# Patient Record
Sex: FEMALE | NOT HISPANIC OR LATINO | URBAN - METROPOLITAN AREA
[De-identification: names, ages, dates, MRNs, and addresses within clinical notes are randomized per-mention and may not be internally consistent; named-entity substitution may affect disease eponyms.]

---

## 2023-06-08 DIAGNOSIS — Z12.31 SCREENING MAMMOGRAM FOR BREAST CANCER: Primary | ICD-10-CM

## 2023-06-09 ENCOUNTER — HOSPITAL ENCOUNTER (OUTPATIENT)
Dept: RADIOLOGY | Facility: HOSPITAL | Age: 43
Discharge: HOME | End: 2023-06-09
Attending: HEALTH CARE PROVIDER
Payer: OTHER GOVERNMENT

## 2023-06-09 DIAGNOSIS — Z12.31 SCREENING MAMMOGRAM FOR BREAST CANCER: ICD-10-CM

## 2023-06-09 PROCEDURE — 77067 MAMMO DIGITAL SCREENING BILAT WITH TOMO: ICD-10-PCS | Mod: 26,,, | Performed by: RADIOLOGY

## 2023-06-09 PROCEDURE — 77067 SCR MAMMO BI INCL CAD: CPT | Mod: 26,,, | Performed by: RADIOLOGY

## 2023-06-09 PROCEDURE — 77063 MAMMO DIGITAL SCREENING BILAT WITH TOMO: ICD-10-PCS | Mod: 26,,, | Performed by: RADIOLOGY

## 2023-06-09 PROCEDURE — 77063 BREAST TOMOSYNTHESIS BI: CPT | Mod: 26,,, | Performed by: RADIOLOGY

## 2023-06-09 PROCEDURE — 77067 SCR MAMMO BI INCL CAD: CPT | Mod: TC

## 2024-05-20 PROCEDURE — 99285 EMERGENCY DEPT VISIT HI MDM: CPT

## 2024-05-21 ENCOUNTER — HOSPITAL ENCOUNTER (EMERGENCY)
Facility: HOSPITAL | Age: 44
Discharge: HOME | End: 2024-05-21
Attending: STUDENT IN AN ORGANIZED HEALTH CARE EDUCATION/TRAINING PROGRAM
Payer: OTHER GOVERNMENT

## 2024-05-21 VITALS
BODY MASS INDEX: 28.73 KG/M2 | HEIGHT: 69 IN | DIASTOLIC BLOOD PRESSURE: 72 MMHG | HEART RATE: 105 BPM | WEIGHT: 194 LBS | OXYGEN SATURATION: 100 % | SYSTOLIC BLOOD PRESSURE: 121 MMHG | RESPIRATION RATE: 20 BRPM | TEMPERATURE: 98 F

## 2024-05-21 DIAGNOSIS — R10.13 EPIGASTRIC PAIN: Primary | ICD-10-CM

## 2024-05-21 DIAGNOSIS — K80.20 CALCULUS OF GALLBLADDER WITHOUT CHOLECYSTITIS WITHOUT OBSTRUCTION: ICD-10-CM

## 2024-05-21 DIAGNOSIS — R74.8 ELEVATED LIVER ENZYMES: ICD-10-CM

## 2024-05-21 LAB
ALBUMIN SERPL BCP-MCNC: 3.8 G/DL (ref 3.5–5.2)
ALP SERPL-CCNC: 104 U/L (ref 55–135)
ALT SERPL W/O P-5'-P-CCNC: 379 U/L (ref 10–44)
ANION GAP SERPL CALC-SCNC: 10 MMOL/L (ref 8–16)
AST SERPL-CCNC: 337 U/L (ref 10–40)
B-HCG UR QL: NEGATIVE
BACTERIA #/AREA URNS HPF: NORMAL /HPF
BASOPHILS # BLD AUTO: 0.03 K/UL (ref 0–0.2)
BASOPHILS NFR BLD: 0.2 % (ref 0–1.9)
BILIRUB SERPL-MCNC: 0.4 MG/DL (ref 0.1–1)
BILIRUB UR QL STRIP: NEGATIVE
BUN SERPL-MCNC: 15 MG/DL (ref 6–20)
CALCIUM SERPL-MCNC: 9.5 MG/DL (ref 8.7–10.5)
CHLORIDE SERPL-SCNC: 109 MMOL/L (ref 95–110)
CLARITY UR: CLEAR
CO2 SERPL-SCNC: 19 MMOL/L (ref 23–29)
COLOR UR: YELLOW
CREAT SERPL-MCNC: 0.9 MG/DL (ref 0.5–1.4)
CTP QC/QA: YES
DIFFERENTIAL METHOD BLD: ABNORMAL
EOSINOPHIL # BLD AUTO: 0.1 K/UL (ref 0–0.5)
EOSINOPHIL NFR BLD: 0.8 % (ref 0–8)
ERYTHROCYTE [DISTWIDTH] IN BLOOD BY AUTOMATED COUNT: 12.7 % (ref 11.5–14.5)
EST. GFR  (NO RACE VARIABLE): >60 ML/MIN/1.73 M^2
GLUCOSE SERPL-MCNC: 103 MG/DL (ref 70–110)
GLUCOSE UR QL STRIP: NEGATIVE
HCT VFR BLD AUTO: 38 % (ref 37–48.5)
HGB BLD-MCNC: 12.6 G/DL (ref 12–16)
HGB UR QL STRIP: NEGATIVE
IMM GRANULOCYTES # BLD AUTO: 0.03 K/UL (ref 0–0.04)
IMM GRANULOCYTES NFR BLD AUTO: 0.2 % (ref 0–0.5)
KETONES UR QL STRIP: NEGATIVE
LEUKOCYTE ESTERASE UR QL STRIP: ABNORMAL
LIPASE SERPL-CCNC: 66 U/L (ref 4–60)
LYMPHOCYTES # BLD AUTO: 3.3 K/UL (ref 1–4.8)
LYMPHOCYTES NFR BLD: 27 % (ref 18–48)
MAGNESIUM SERPL-MCNC: 2.3 MG/DL (ref 1.6–2.6)
MCH RBC QN AUTO: 29.9 PG (ref 27–31)
MCHC RBC AUTO-ENTMCNC: 33.2 G/DL (ref 32–36)
MCV RBC AUTO: 90 FL (ref 82–98)
MICROSCOPIC COMMENT: NORMAL
MONOCYTES # BLD AUTO: 0.7 K/UL (ref 0.3–1)
MONOCYTES NFR BLD: 5.7 % (ref 4–15)
NEUTROPHILS # BLD AUTO: 8.2 K/UL (ref 1.8–7.7)
NEUTROPHILS NFR BLD: 66.1 % (ref 38–73)
NITRITE UR QL STRIP: NEGATIVE
NRBC BLD-RTO: 0 /100 WBC
OHS QRS DURATION: 84 MS
OHS QTC CALCULATION: 455 MS
PH UR STRIP: 7 [PH] (ref 5–8)
PLATELET # BLD AUTO: 309 K/UL (ref 150–450)
PMV BLD AUTO: 10.7 FL (ref 9.2–12.9)
POTASSIUM SERPL-SCNC: 3.8 MMOL/L (ref 3.5–5.1)
PROT SERPL-MCNC: 7.1 G/DL (ref 6–8.4)
PROT UR QL STRIP: NEGATIVE
RBC # BLD AUTO: 4.21 M/UL (ref 4–5.4)
RBC #/AREA URNS HPF: 3 /HPF (ref 0–4)
SODIUM SERPL-SCNC: 138 MMOL/L (ref 136–145)
SP GR UR STRIP: 1.02 (ref 1–1.03)
TROPONIN I SERPL DL<=0.01 NG/ML-MCNC: <0.006 NG/ML (ref 0–0.03)
URN SPEC COLLECT METH UR: ABNORMAL
UROBILINOGEN UR STRIP-ACNC: NEGATIVE EU/DL
WBC # BLD AUTO: 12.36 K/UL (ref 3.9–12.7)
WBC #/AREA URNS HPF: 2 /HPF (ref 0–5)

## 2024-05-21 PROCEDURE — 83690 ASSAY OF LIPASE: CPT | Performed by: PHYSICIAN ASSISTANT

## 2024-05-21 PROCEDURE — 85025 COMPLETE CBC W/AUTO DIFF WBC: CPT | Performed by: PHYSICIAN ASSISTANT

## 2024-05-21 PROCEDURE — 81000 URINALYSIS NONAUTO W/SCOPE: CPT | Performed by: PHYSICIAN ASSISTANT

## 2024-05-21 PROCEDURE — 93010 ELECTROCARDIOGRAM REPORT: CPT | Mod: ,,, | Performed by: INTERNAL MEDICINE

## 2024-05-21 PROCEDURE — 83735 ASSAY OF MAGNESIUM: CPT | Performed by: PHYSICIAN ASSISTANT

## 2024-05-21 PROCEDURE — 93005 ELECTROCARDIOGRAM TRACING: CPT

## 2024-05-21 PROCEDURE — 81025 URINE PREGNANCY TEST: CPT | Performed by: STUDENT IN AN ORGANIZED HEALTH CARE EDUCATION/TRAINING PROGRAM

## 2024-05-21 PROCEDURE — 84484 ASSAY OF TROPONIN QUANT: CPT | Performed by: PHYSICIAN ASSISTANT

## 2024-05-21 PROCEDURE — 80053 COMPREHEN METABOLIC PANEL: CPT | Performed by: PHYSICIAN ASSISTANT

## 2024-05-21 RX ORDER — FAMOTIDINE 20 MG/1
20 TABLET, FILM COATED ORAL 2 TIMES DAILY
Qty: 28 TABLET | Refills: 0 | Status: SHIPPED | OUTPATIENT
Start: 2024-05-21 | End: 2024-06-04

## 2024-05-21 RX ORDER — ONDANSETRON 4 MG/1
4 TABLET, ORALLY DISINTEGRATING ORAL EVERY 6 HOURS PRN
Qty: 12 TABLET | Refills: 0 | Status: SHIPPED | OUTPATIENT
Start: 2024-05-21

## 2024-05-21 RX ORDER — DICYCLOMINE HYDROCHLORIDE 20 MG/1
20 TABLET ORAL 4 TIMES DAILY PRN
Qty: 20 TABLET | Refills: 0 | Status: SHIPPED | OUTPATIENT
Start: 2024-05-21 | End: 2024-06-20

## 2024-05-21 RX ORDER — OXYCODONE HYDROCHLORIDE 5 MG/1
5 TABLET ORAL EVERY 6 HOURS PRN
Qty: 12 TABLET | Refills: 0 | Status: SHIPPED | OUTPATIENT
Start: 2024-05-21

## 2024-05-21 NOTE — ED PROVIDER NOTES
Encounter Date: 5/20/2024       History     Chief Complaint   Patient presents with    Flank Pain     Pt presents to ER with complaints of right side pain and nausea since Saturday. Pt states she vomited today. Pt states when the pain is bad she feels as if she can't breath like she might be having a heart attack. Pt states she os also bloated. Pt states the pain comes in waves and she gets sweaty and freezing at the same time. Pt rates pain 8/10 at this time. Pt denies any other issues at this time.       43-year-old female presents to ED complaining of severe upper abdominal pain with associated nausea vomiting which began on Saturday.    Patient states Saturday midday after [employer] she developed intense, sharp, severe pain to the epigastric abdomen.  Admits to associated diaphoresis, nausea, bloating to the upper abdomen.  Pain radiated to her bilateral upper back.  She states after a couple of hours the pain eventually resolved. She states she has had 2 more such episodes each evening for the past 2 nights.  Typically she is lying in bed when she begins with severe pain.  Feels the need to sit upright, leaning forward.  Pain is worsened when she lies supine.  She denies history of reflux, denies history of any gallbladder issues.  She does admit to somewhat of chronic constipation; she states when she passes gas there is no real improvement of discomfort.  Took a laxative to see if that would improve her symptoms,  however states she did have a normal BM today with recurrence of symptoms later on this evening.  Denies melena or hematochezia.  Denies history of GI bleeding.  Pain is not necessarily postprandial, typically occurs when she is lying down at night over the past 2 nights.  No urinary complaints.    No personal history of ACS.  Denies chest pain or shortness of breath.  She does state that there is pain with deep inspiration when she is having the discomfort.  Otherwise no shortness of breath.  Denies  hypertension, DM.  Denies prediabetes.  States she was told she had mild hyperlipidemia, not currently on any medications.  Nonsmoker.  Denies known family history of premature cardiac disease.  No syncope.  No palpitations.    No cough, no fever chills, no recent illness.  No exogenous estrogen.  No unilateral leg swelling or calf pain.  Denies history of CHF.  Pain resolved prior to my evaluation this evening.  Pain did occur following a steak dinner tonight.    No history of any abdominal surgeries    PMH:  GERD   Make bipolar disorder   History of sinusitis   Generalized anxiety disorder   Major depression   Obsessive-compulsive disorder   Right breast mass   Hypercholesterolemia         Review of patient's allergies indicates:  No Known Allergies  No past medical history on file.  Past Surgical History:   Procedure Laterality Date    BREAST BIOPSY Right     benign-2022     Family History   Problem Relation Name Age of Onset    Breast cancer Paternal Aunt pa 60    Breast cancer Paternal Aunt aneta 60    Ovarian cancer Paternal Grandmother  70        Review of Systems   Constitutional:  Positive for diaphoresis. Negative for appetite change and fever.   Respiratory:  Negative for cough and shortness of breath.    Cardiovascular:  Negative for chest pain, palpitations and leg swelling.   Gastrointestinal:  Positive for abdominal pain, nausea and vomiting. Negative for blood in stool, constipation and diarrhea.   Genitourinary:  Negative for dysuria, frequency and hematuria.   Musculoskeletal:  Positive for back pain. Negative for myalgias, neck pain and neck stiffness.   Neurological:  Negative for syncope.       Physical Exam     Initial Vitals [05/20/24 2358]   BP Pulse Resp Temp SpO2   121/72 105 20 98.3 °F (36.8 °C) 100 %      MAP       --         Physical Exam    Nursing note and vitals reviewed.  Constitutional: She appears well-developed and well-nourished. She is not diaphoretic. No distress.   HENT:    Head: Normocephalic and atraumatic.   Neck: Neck supple.   Normal range of motion.  Cardiovascular:            Sinus tachycardia.  No unilateral swelling or calf tenderness.  No pretibial edema.   Pulmonary/Chest: Breath sounds normal. No respiratory distress.   Abdominal:   Abdomen overall soft, normal bowel sounds ×4.  Very mild tenderness to epigastric abdomen.  Negative Fisher sign. No rebound or guarding.  No palpable mass or distention.  No flank or CVA tenderness.  No pain over McBurney's point.   Musculoskeletal:      Cervical back: Normal range of motion and neck supple.     Neurological: She is alert and oriented to person, place, and time. GCS score is 15. GCS eye subscore is 4. GCS verbal subscore is 5. GCS motor subscore is 6.   Skin: Skin is warm.   Psychiatric: Thought content normal.   Mildly anxious         ED Course   Procedures  Labs Reviewed   CBC W/ AUTO DIFFERENTIAL - Abnormal; Notable for the following components:       Result Value    Gran # (ANC) 8.2 (*)     All other components within normal limits   COMPREHENSIVE METABOLIC PANEL - Abnormal; Notable for the following components:    CO2 19 (*)      (*)      (*)     All other components within normal limits   LIPASE - Abnormal; Notable for the following components:    Lipase 66 (*)     All other components within normal limits   URINALYSIS, REFLEX TO URINE CULTURE - Abnormal; Notable for the following components:    Leukocytes, UA 1+ (*)     All other components within normal limits    Narrative:     Specimen Source->Urine   MAGNESIUM   TROPONIN I   URINALYSIS MICROSCOPIC    Narrative:     Specimen Source->Urine   TROPONIN I   POCT URINE PREGNANCY     EKG Readings: (Independently Interpreted)   Normal sinus rhythm, ventricular rate 70 beats per minute.  Normal TX, normal QT, normal QRS duration.  No convincing right axis deviation.  No obvious ST elevation.  Inverted T-wave aVL.  Flattening versus inversion T-wave V2.  No  "previous for comparison.       Imaging Results              US Abdomen Limited (Final result)  Result time 05/21/24 02:42:44      Final result by Miguel Nettles MD (05/21/24 02:42:44)                   Impression:      Cholelithiasis.  No sonographic findings to suggest acute cholecystitis.  No bile duct dilatation.    Septated cyst upper pole right kidney measuring 2.2 cm.      Electronically signed by: Miguel Nettles MD  Date:    05/21/2024  Time:    02:42               Narrative:    EXAMINATION:  US ABDOMEN LIMITED    CLINICAL HISTORY:  epigastric pain;    TECHNIQUE:  Limited ultrasound of the right upper quadrant of the abdomen focused on the biliary system was performed.    COMPARISON:  None    FINDINGS:  Gallbladder: Multiple gallstones are seen.  There is no gallbladder wall thickening or pericholecystic fluid.  No sonographic Fisher's sign.    Biliary system: The common duct is not dilated, measuring 2.4 mm.  No intrahepatic ductal dilatation.    Pancreas: The visualized portions of pancreas appear normal.    Miscellaneous: No upper abdominal ascites and no abnormalities of the visualized liver.  Septated cyst upper pole right kidney measuring up to 2.2 cm.                                       X-Ray Chest 1 View (Final result)  Result time 05/21/24 01:40:53      Final result by Nnamdi Jang MD (05/21/24 01:40:53)                   Impression:      No acute cardiopulmonary finding identified on this single view.      Electronically signed by: Nnamdi Jang MD  Date:    05/21/2024  Time:    01:40               Narrative:    EXAMINATION:  XR CHEST 1 VIEW    CLINICAL HISTORY:  Provided history is "  Epigastric pain".    TECHNIQUE:  One view of the chest.    COMPARISON:  None.    FINDINGS:  Cardiac silhouette is not enlarged.  No focal consolidation.  No sizable pleural effusion.  No pneumothorax.                                    X-Rays:   Independently Interpreted Readings:   Chest X-Ray: " Personal interpretation:  No significant cardiomegaly, no convincing effusion, no obvious pneumothorax, no dense lobar consolidation.     Medications - No data to display  Medical Decision Making  Differential diagnosis:  Small-bowel obstruction, enteritis, colitis, GERD, cholecystitis, intestinal bloating, ACS, PE    Amount and/or Complexity of Data Reviewed  External Data Reviewed: notes.  Labs: ordered. Decision-making details documented in ED Course.  Radiology: ordered and independent interpretation performed. Decision-making details documented in ED Course.  ECG/medicine tests: ordered and independent interpretation performed. Decision-making details documented in ED Course.     Details: No convincing evidence of acute ischemia.    Risk  Prescription drug management.               ED Course as of 05/21/24 0413   Tue May 21, 2024   0332 Elevated liver enzymes, only slightly elevated pancreatic enzymes.  I wonder if she had a stone that ended up causing some choledocho; however, there is normal total bilirubin. Will refer to GI for further evaluation, may be liver primary issue or something like a gallbladder colic/choledocholithiasis.  No fever.  Only very mild tenderness on exam today.  Denies any severe pain at this time.  Discussed plan for referral to GI and General surgery in the meantime.  Advised outpatient follow-up at her earliest convenience, although she is moving to [place] next week.  Discussed red flags and reasons for return.  At this time, she does feel comfortable plan, feels comfortable with discharge and outpatient follow-up.  Given pain medications should she have any discomfort while moving. [SM]      ED Course User Index  [SM] Keshav Salmeron, YURIY                           Clinical Impression:  Final diagnoses:  [R10.13] Epigastric pain (Primary)  [R74.8] Elevated liver enzymes  [K80.20] Calculus of gallbladder without cholecystitis without obstruction          ED Disposition Condition     Discharge Stable          ED Prescriptions       Medication Sig Dispense Start Date End Date Auth. Provider    dicyclomine (BENTYL) 20 mg tablet Take 1 tablet (20 mg total) by mouth 4 (four) times daily as needed (Abdominal cramping). 20 tablet 5/21/2024 6/20/2024 Keshav Salmeron PA-C    famotidine (PEPCID) 20 MG tablet Take 1 tablet (20 mg total) by mouth 2 (two) times daily. for 14 days 28 tablet 5/21/2024 6/4/2024 Keshav Salmeron PA-C    ondansetron (ZOFRAN-ODT) 4 MG TbDL Take 1 tablet (4 mg total) by mouth every 6 (six) hours as needed (nausea). 12 tablet 5/21/2024 -- Keshav Salmeron PA-C    oxyCODONE (ROXICODONE) 5 MG immediate release tablet Take 1 tablet (5 mg total) by mouth every 6 (six) hours as needed for Pain (severe pain). 12 tablet 5/21/2024 -- Keshav Salmeron PA-C          Follow-up Information       Follow up With Specialties Details Why Contact Info    Delroy Hernandez MD Gastroenterology Schedule an appointment as soon as possible for a visit  For reevaluation with GI [address] [facility] [address]  [phone]      [facility] GASTROENTEROLOGY Gastroenterology Schedule an appointment as soon as possible for a visit  For reevaluation with GI [address] [facility] [place] [address] [phone]    Moshe Alfonso MD General Surgery, Surgery Schedule an appointment as soon as possible for a visit  For reevaluation with Gen Surg [address]  [address]  [facility] [address]  [phone]               Keshav Salmeron PA-C  05/21/24 2720

## 2024-05-21 NOTE — DISCHARGE INSTRUCTIONS
Low-fat diet.  Begin taking Pepcid twice daily.  Bentyl as needed for any abdominal cramping.  Zofran as needed for nausea.  Oxycodone only for severe or breakthrough pain. Be aware, this medication is sedating.  Do not mix with alcohol or any other sedating medications.  Do not drive or operate machinery when taking this medication.     Follow up and establish care with a local general surgeon and a local gastroenterologist for re-evaluation and further recommendations.    Please return to this ED if you experience severe worsening pain despite treatment, if unable to eat or drink, if you begin with frequent vomiting, if you develop fever, if any other problems occur.